# Patient Record
Sex: FEMALE | Race: OTHER | NOT HISPANIC OR LATINO
[De-identification: names, ages, dates, MRNs, and addresses within clinical notes are randomized per-mention and may not be internally consistent; named-entity substitution may affect disease eponyms.]

---

## 2023-05-23 ENCOUNTER — APPOINTMENT (OUTPATIENT)
Dept: OTOLARYNGOLOGY | Facility: CLINIC | Age: 53
End: 2023-05-23
Payer: COMMERCIAL

## 2023-05-23 VITALS
BODY MASS INDEX: 28.72 KG/M2 | SYSTOLIC BLOOD PRESSURE: 128 MMHG | WEIGHT: 183 LBS | HEIGHT: 67 IN | HEART RATE: 72 BPM | TEMPERATURE: 97 F | DIASTOLIC BLOOD PRESSURE: 97 MMHG

## 2023-05-23 PROBLEM — Z00.00 ENCOUNTER FOR PREVENTIVE HEALTH EXAMINATION: Status: ACTIVE | Noted: 2023-05-23

## 2023-05-23 PROCEDURE — 31231 NASAL ENDOSCOPY DX: CPT

## 2023-05-23 PROCEDURE — 99204 OFFICE O/P NEW MOD 45 MIN: CPT | Mod: 25

## 2023-05-23 NOTE — PROCEDURE
[FreeTextEntry3] : Nasal Endoscopy:\par mild sinonasal mucosal edema/erythema\par clear mucus throughout\par white mucoid drainage from right SER\par no mucopus or polyps

## 2023-05-23 NOTE — DATA REVIEWED
[de-identified] : Thyroid US 9/24/2018:\par INDINGS: \par The right thyroid gland measures 4.6 x 1.3 x 1.6 cm. The left thyroid gland measures 4.7 x 1.0 x 1.6 cm. \par \par The isthmus is normal measuring 3 mm. Solid heterogeneous nodule in the right isthmus measures 2.4 x 1.2 x 2.4 cm.\par \par The right thyroid lobe demonstrates normal echogenicity and normal blood flow. There is no discrete right thyroid nodule.\par \par The left thyroid lobe demonstrates normal echogenicity and normal blood flow.  Solid nodule in the left lower pole measures 1.7 x 0.7 x 1.2 cm..\par \par IMPRESSION: Two solid thyroid nodules, the dominant nodule in the right isthmus and left lower pole.

## 2023-05-23 NOTE — PHYSICAL EXAM
[de-identified] : ~2cm midline infrahyoid mass, soft, nontender - isthmus vs TGDC? [de-identified] : cerumen removed from right EAC w curet. TM intact, ME clear [Nasal Endoscopy Performed] : nasal endoscopy was performed, see procedure section for findings [Midline] : trachea located in midline position [Normal] : no rashes

## 2023-05-23 NOTE — HISTORY OF PRESENT ILLNESS
[de-identified] : 52F here for initial evaluation.\par \par For years, but in particular over the past month, she c/o cheek pain/discomfort with nasal congestion/obstruction. There is also a dry cough. Nasal sprays do not help and she was also given zpak which did not help recently.\par There are allergies on prior testing and she underwent shots for ~1yr but felt they did not help and stopped them.\par She has known thyroid nodules with prior benign FNAs in 2018. She feels like sometimes there is something in her neck.\par There is no difficulty eating, breathing, swallowing or talking.\par \par ROS otherwise unremarkable.

## 2023-05-23 NOTE — CONSULT LETTER
[Dear  ___] : Dear  [unfilled], [Courtesy Letter:] : I had the pleasure of seeing your patient, [unfilled], in my office today. [Consult Closing:] : Thank you very much for allowing me to participate in the care of this patient.  If you have any questions, please do not hesitate to contact me. [Sincerely,] : Sincerely, [FreeTextEntry3] : Amrik Solomon MD\par Department of Otolaryngology - Head and Neck Surgery\par [facility]

## 2023-05-23 NOTE — ASSESSMENT
[FreeTextEntry1] : 52F here for initial evaluation. For years, but in particular over the past month, she c/o cheek pain/discomfort with nasal congestion/obstruction. There is also a dry cough. Nasal sprays do not help and she was also given zpak which did not help recently. There are allergies on prior testing and she underwent shots for ~1yr but felt they did not help and stopped them. She has known thyroid nodules with prior benign FNAs in 2018. She feels like sometimes there is something in her neck. There is no difficulty eating, breathing, swallowing or talking.\par On exam, there is a 2cm midline infrahyoid mass, soft and nontender. Cerumen was removed from the right EAC.\par Nasal endoscopy shows mild sinonasal mucosal edema/erythema with clear mucus throughout and white mucoid drainage from right SER.\par -Regarding her nasal sx, there is a rhinitis with turbinate hypertrophy on exam, and likely underlying sinusitis as well. Sx have been present for years, but only recently have gotten much worse; nasal sprays and recent course of abx have not helped -> will send for CT sinus and RTO for review.\par -She also has in infrahyoid neck mass - either isthmus nodule or TGDC -> will send for CT neck.\par -Lastly, there is h/o asthma, but she is coughing and only on singulair and no inhalers, and perhaps asthma can be better controlled -> will send for formal pulmonary eval w Dr. Bethea. \par RTO 4 weeks. Yes

## 2023-06-13 ENCOUNTER — APPOINTMENT (OUTPATIENT)
Dept: PULMONOLOGY | Facility: CLINIC | Age: 53
End: 2023-06-13
Payer: COMMERCIAL

## 2023-06-13 VITALS
HEIGHT: 67 IN | WEIGHT: 183 LBS | SYSTOLIC BLOOD PRESSURE: 114 MMHG | DIASTOLIC BLOOD PRESSURE: 86 MMHG | BODY MASS INDEX: 28.72 KG/M2 | HEART RATE: 64 BPM | OXYGEN SATURATION: 98 % | TEMPERATURE: 97.6 F

## 2023-06-13 DIAGNOSIS — Z82.5 FAMILY HISTORY OF ASTHMA AND OTHER CHRONIC LOWER RESPIRATORY DISEASES: ICD-10-CM

## 2023-06-13 PROCEDURE — 99244 OFF/OP CNSLTJ NEW/EST MOD 40: CPT | Mod: 25

## 2023-06-13 PROCEDURE — 94729 DIFFUSING CAPACITY: CPT

## 2023-06-13 PROCEDURE — 94060 EVALUATION OF WHEEZING: CPT

## 2023-06-13 PROCEDURE — 95012 NITRIC OXIDE EXP GAS DETER: CPT

## 2023-06-13 PROCEDURE — 94727 GAS DIL/WSHOT DETER LNG VOL: CPT

## 2023-06-13 RX ORDER — FLUTICASONE FUROATE AND VILANTEROL TRIFENATATE 100; 25 UG/1; UG/1
100-25 POWDER RESPIRATORY (INHALATION)
Qty: 3 | Refills: 1 | Status: ACTIVE | COMMUNITY
Start: 2023-06-13 | End: 1900-01-01

## 2023-06-13 NOTE — ASSESSMENT
[FreeTextEntry1] : Data reviewed:\par \par PFT 06/13/2023 : normal, no BD response / FENO 46\par \par Impression:\par Asthma\par \par Plan:\par I am surprised that her lung function is normal today but she has a history of lifelong asthma and is wheezing today. Start Breo 100-25 or as covered and return 1-2 mos. If no change on controllers then would consider MCT to confirm dx of asthma, chest CT to exclude alternate diagnoses, but at this time asthma is the likeliest diagnosis.

## 2023-06-13 NOTE — CONSULT LETTER
[Dear  ___] : Dear  [unfilled], [( Thank you for referring [unfilled] for consultation for _____ )] : Thank you for referring [unfilled] for consultation for [unfilled] [Please see my note below.] : Please see my note below. [Consult Closing:] : Thank you very much for allowing me to participate in the care of this patient.  If you have any questions, please do not hesitate to contact me. [Sincerely,] : Sincerely, [FreeTextEntry2] : Amrik Solomon MD\par [address]\[place] [address] [FreeTextEntry3] : Maritza Bethea MD, FCCP\par

## 2023-06-13 NOTE — HISTORY OF PRESENT ILLNESS
[TextBox_4] : 06/13/2023: Asked to evaluate patient by Dr Solomon for evaluation of asthma. Lifelong asthma since growing up in [place] Goes to ED about 4x a year and gets prednisone, at change of season. Admitted 2x for asthma and never ICU/intubated. Takes montelukast, albuterol prn, about 1x a day, sometimes 2. Currently not having nocturnal awakenings. Never smoker, works at [employer]. + environmental allergies. Has had IT in the past with no benefit. Has a dog at home in [place].\par

## 2023-06-13 NOTE — PHYSICAL EXAM
[No Acute Distress] : no acute distress [Normal Rate/Rhythm] : normal rate/rhythm [Normal S1, S2] : normal s1, s2 [No Murmurs] : no murmurs [No Clubbing] : no clubbing [No Edema] : no edema [Normal Affect] : normal affect [TextBox_68] : wheezing

## 2023-06-20 ENCOUNTER — APPOINTMENT (OUTPATIENT)
Dept: OTOLARYNGOLOGY | Facility: CLINIC | Age: 53
End: 2023-06-20
Payer: COMMERCIAL

## 2023-06-20 VITALS
HEART RATE: 71 BPM | WEIGHT: 186 LBS | BODY MASS INDEX: 29.19 KG/M2 | SYSTOLIC BLOOD PRESSURE: 138 MMHG | HEIGHT: 67 IN | TEMPERATURE: 98.2 F | DIASTOLIC BLOOD PRESSURE: 94 MMHG

## 2023-06-20 DIAGNOSIS — J32.9 CHRONIC SINUSITIS, UNSPECIFIED: ICD-10-CM

## 2023-06-20 PROCEDURE — 99213 OFFICE O/P EST LOW 20 MIN: CPT

## 2023-06-20 NOTE — HISTORY OF PRESENT ILLNESS
[de-identified] : 52F here in followup.\par \par For years, but in particular over the past month, she c/o cheek pain/discomfort with nasal congestion/obstruction. There is also a dry cough. Nasal sprays do not help and she was also given zpak which did not help recently.\par There are allergies on prior testing and she underwent shots for ~1yr but felt they did not help and stopped them.\par She has known thyroid nodules with prior benign FNAs in 2018. She feels like sometimes there is something in her neck.\par There is no difficulty eating, breathing, swallowing or talking.\par \par CT Neck 6/7/23 (I reviewed images):\par -3cm heterogeneously dense enhancing mass in the midline and just to the left of the midline adjacent to but appearing extrinsic from the isthmus of the thyroid gland. \par \par CT Sinus 6/7/23 (I reviewed images):\par -inferior turbinate hypertrophy\par -mild septal deviation\par -paranasal sinuses clear\par \par ROS otherwise unremarkable.

## 2023-06-20 NOTE — PHYSICAL EXAM
[de-identified] : ~2cm midline infrahyoid mass, soft, nontender - isthmus vs TGDC? [de-identified] : cerumen removed from right EAC w curet. TM intact, ME clear [Nasal Endoscopy Performed] : nasal endoscopy was performed, see procedure section for findings [Midline] : trachea located in midline position [Normal] : no rashes

## 2023-06-20 NOTE — ASSESSMENT
[FreeTextEntry1] : 52F here in followup. For years, but in particular over the past month, she c/o cheek pain/discomfort with nasal congestion/obstruction. There is also a dry cough. Nasal sprays do not help and she was also given zpak which did not help recently. There are allergies on prior testing and she underwent shots for ~1yr but felt they did not help and stopped them. She has known thyroid nodules with prior benign FNAs in 2018. She feels like sometimes there is something in her neck. There is no difficulty eating, breathing, swallowing or talking. CT neck shows a 3cm heterogeneously dense enhancing mass in the midline and just to the left of the midline adjacent to but appearing extrinsic from the isthmus of the thyroid gland c/w goitrous pyramidal lobe. CT sinus shows inferior turbinate hypertrophy and mild septal deviation. On exam, there is a 3cm midline infrahyoid mass, soft and nontender. Nasal endoscopy shows mild sinonasal mucosal edema/erythema with clear mucus throughout and white mucoid drainage from right SER.\par -Regarding her nasal sx, there is a rhinitis with turbinate hypertrophy on exam and imaging. There is no radiographic e/o sinusitis, as above. For now, continue nasal sprays. If not effective, would recommend septoplasty/turbinate reduction.\par -She also has in infrahyoid neck mass - this is c/w very large pyramidal lobe vs isthmus nodule, as seen on imaging. Will send for formal thyroid sonogram and RTO for review. Given the CT, would likely recommend isthmusectomy.\par

## 2023-06-20 NOTE — DATA REVIEWED
[de-identified] : Thyroid US 9/24/2018:\par INDINGS: \par The right thyroid gland measures 4.6 x 1.3 x 1.6 cm. The left thyroid gland measures 4.7 x 1.0 x 1.6 cm. \par \par The isthmus is normal measuring 3 mm. Solid heterogeneous nodule in the right isthmus measures 2.4 x 1.2 x 2.4 cm.\par \par The right thyroid lobe demonstrates normal echogenicity and normal blood flow. There is no discrete right thyroid nodule.\par \par The left thyroid lobe demonstrates normal echogenicity and normal blood flow.  Solid nodule in the left lower pole measures 1.7 x 0.7 x 1.2 cm..\par \par IMPRESSION: Two solid thyroid nodules, the dominant nodule in the right isthmus and left lower pole. [de-identified] : CT Neck 6/7/23:\par FINDINGS:  Cursory evaluation of the intracranial compartment reveals nothing abnormal. There is no evidence of pathologic contrast enhancement. The fourth ventricle is midline. The cerebellopontine angle cisterns are clear. The cranial base intact. The nasopharynx and parapharyngeal spaces are clear. The major salivary glands are unremarkable. There is no evidence of upper aerodigestive tract mass. The supraclavicular region and upper mediastinum are clear\par \par There is a heterogeneous 3 cm mass in the midline anterior neck extending to the left of the midline. Density characteristics suggest a thyroid lesion, perhaps goitrous however this lesion appears separate from the isthmus of the thyroid gland possibly within the thyroglossal duct. The thyroid lobes and isthmus are somewhat heterogeneous but otherwise unremarkable\par \par The enhancement character of this lesion cannot be determined as no precontrast images are made available for review\par \par IMPRESSION:  3 cm heterogeneously dense possibly enhancing mass in the midline and just to the left of the midline adjacent to but appearing extrinsic from the isthmus of the thyroid gland. Please see above comments\par \par CT Sinus 6/7/23:\par FINDINGS: Cursory evaluation of the intracranial compartment reveals nothing abnormal. The fourth ventricle is midline. The cerebellopontine angle cisterns are clear. The cranial base is intact. The nasopharynx and parapharyngeal spaces are clear.\par \par The frontal sinus is well developed and clear\par \par The sphenoid sinus is well developed and clear\par \par There is little if any appreciable ethmoid mucosal thickening. There are small retention cyst along the floor of the right maxillary antrum and lateral wall of left maxillary antrum.\par \par There is thickening and irregular reverse S-shaped deviation of nasal septum with septal spur formation at the apex of both curves especially on the right. Inferior nasal turbinate is thicker than the left. Middle nasal turbinates are moderately prominent. Mucosal thickening surrounds both uncinate processes and there are multiple bilateral Angelica cells larger on the left side. Mucosal thickening surrounds both uncinate processes. The floor of the anterior cranial fossa is intact\par \par IMPRESSION:  Thickening and reverse S shaped deviation of nasal septum with prominent nasal turbinates and large bilateral Angelica cells especially on the left. Mucosal thickening/retention cyst formation as indicated\par

## 2023-06-20 NOTE — CONSULT LETTER
[Dear  ___] : Dear  [unfilled], [Courtesy Letter:] : I had the pleasure of seeing your patient, [unfilled], in my office today. [Consult Closing:] : Thank you very much for allowing me to participate in the care of this patient.  If you have any questions, please do not hesitate to contact me. [Sincerely,] : Sincerely, [FreeTextEntry3] : Amrik Solomon MD\par Department of Otolaryngology - Head and Neck Surgery\par [facility]

## 2023-06-29 ENCOUNTER — RESULT REVIEW (OUTPATIENT)
Age: 53
End: 2023-06-29

## 2023-06-29 ENCOUNTER — APPOINTMENT (OUTPATIENT)
Dept: ULTRASOUND IMAGING | Facility: HOSPITAL | Age: 53
End: 2023-06-29

## 2023-06-29 ENCOUNTER — OUTPATIENT (OUTPATIENT)
Dept: OUTPATIENT SERVICES | Facility: HOSPITAL | Age: 53
LOS: 1 days | End: 2023-06-29
Payer: COMMERCIAL

## 2023-06-29 PROCEDURE — 76536 US EXAM OF HEAD AND NECK: CPT | Mod: 26

## 2023-06-29 PROCEDURE — 76536 US EXAM OF HEAD AND NECK: CPT

## 2023-07-18 ENCOUNTER — APPOINTMENT (OUTPATIENT)
Dept: OTOLARYNGOLOGY | Facility: CLINIC | Age: 53
End: 2023-07-18
Payer: COMMERCIAL

## 2023-07-18 VITALS
DIASTOLIC BLOOD PRESSURE: 88 MMHG | TEMPERATURE: 96 F | SYSTOLIC BLOOD PRESSURE: 128 MMHG | HEIGHT: 67 IN | BODY MASS INDEX: 29.19 KG/M2 | WEIGHT: 186 LBS | HEART RATE: 73 BPM

## 2023-07-18 DIAGNOSIS — R22.1 LOCALIZED SWELLING, MASS AND LUMP, NECK: ICD-10-CM

## 2023-07-18 PROCEDURE — 99213 OFFICE O/P EST LOW 20 MIN: CPT

## 2023-07-18 RX ORDER — FLUTICASONE PROPIONATE 50 UG/1
50 SPRAY, METERED NASAL DAILY
Qty: 1 | Refills: 3 | Status: ACTIVE | COMMUNITY
Start: 2023-07-18 | End: 1900-01-01

## 2023-07-18 RX ORDER — AZELASTINE HYDROCHLORIDE 137 UG/1
137 SPRAY, METERED NASAL DAILY
Qty: 1 | Refills: 2 | Status: ACTIVE | COMMUNITY
Start: 2023-07-18 | End: 1900-01-01

## 2023-07-18 NOTE — DATA REVIEWED
[de-identified] : Thyroid US 6/29/23:\par Findings:\par \par RIGHT LOBE:\par Dimensions: 4.1 x 1.9 x 1.2 cm\par Echotexture: Homogenous.\par Vascularity: Normal.\par \par Nodule 1:\par Location: Upper pole\par Dimensions: 1.6 x 0.6 x 1.2 cm\par Composition: Mixed cystic and solid (1 point)\par Echogenicity (for solid component): Isoechoic\par Shape: Wider than tall.\par Margin: Ill-defined.\par Echogenic Foci: None.\par \par TI-RADS Score:  2\par \par \par LEFT LOBE:\par Dimensions: 3.8 x 1.2 x 1.7 cm\par Echotexture: Homogenous.\par Vascularity: Normal.\par \par Nodule 1:\par Location: Mid lobe\par Dimensions: 0.5 x 0.4 x 0.7 cm\par Composition: Mixed cystic and solid (1 point)\par Echogenicity (for solid component): Isoechoic.\par Shape: Wider than tall.\par Margin: Smooth.\par Echogenic Foci: None.\par \par TI-RADS Score: 2\par \par \par Nodule 2:\par Location: Lower pole\par Dimensions: 1.3 x 0.9 x 1.4 cm\par Composition: Mixed cystic and solid (1 point)\par Echogenicity (for solid component): Hypoechoic.\par Shape: Wider than tall.\par Margin: Smooth.\par Echogenic Foci: None.\par \par TI-RADS Score: 3\par \par \par ISTHMUS:\par Dimensions: 0.5 cm AP\par \par Nodule 1:\par Location: Midline\par Dimensions: 3.5 x 1.5 x 3.1 cm\par Composition: Almost completely solid (2 points)\par Echogenicity (for solid component): Isoechoic.\par Shape: Wider than tall.\par Margin: Smooth.\par Echogenic Foci: None.\par \par TI-RADS Score: 3\par \par \par Cervical Lymph Node Evaluation: No abnormal lymph nodes are identified in the neck.\par \par Parathyroid Examination: Parathyroid glands not visualized.\par \par \par IMPRESSION:\par Multinodular thyroid gland. Dominant 3.5 cm nodule in isthmus meets criteria for biopsy.\par \par \par \par Thyroid US 9/24/2018:\par INDINGS: \par The right thyroid gland measures 4.6 x 1.3 x 1.6 cm. The left thyroid gland measures 4.7 x 1.0 x 1.6 cm. \par \par The isthmus is normal measuring 3 mm. Solid heterogeneous nodule in the right isthmus measures 2.4 x 1.2 x 2.4 cm.\par \par The right thyroid lobe demonstrates normal echogenicity and normal blood flow. There is no discrete right thyroid nodule.\par \par The left thyroid lobe demonstrates normal echogenicity and normal blood flow.  Solid nodule in the left lower pole measures 1.7 x 0.7 x 1.2 cm..\par \par IMPRESSION: Two solid thyroid nodules, the dominant nodule in the right isthmus and left lower pole. [de-identified] : CT Neck 6/7/23:\par FINDINGS:  Cursory evaluation of the intracranial compartment reveals nothing abnormal. There is no evidence of pathologic contrast enhancement. The fourth ventricle is midline. The cerebellopontine angle cisterns are clear. The cranial base intact. The nasopharynx and parapharyngeal spaces are clear. The major salivary glands are unremarkable. There is no evidence of upper aerodigestive tract mass. The supraclavicular region and upper mediastinum are clear\par \par There is a heterogeneous 3 cm mass in the midline anterior neck extending to the left of the midline. Density characteristics suggest a thyroid lesion, perhaps goitrous however this lesion appears separate from the isthmus of the thyroid gland possibly within the thyroglossal duct. The thyroid lobes and isthmus are somewhat heterogeneous but otherwise unremarkable\par \par The enhancement character of this lesion cannot be determined as no precontrast images are made available for review\par \par IMPRESSION:  3 cm heterogeneously dense possibly enhancing mass in the midline and just to the left of the midline adjacent to but appearing extrinsic from the isthmus of the thyroid gland. Please see above comments\par \par CT Sinus 6/7/23:\par FINDINGS: Cursory evaluation of the intracranial compartment reveals nothing abnormal. The fourth ventricle is midline. The cerebellopontine angle cisterns are clear. The cranial base is intact. The nasopharynx and parapharyngeal spaces are clear.\par \par The frontal sinus is well developed and clear\par \par The sphenoid sinus is well developed and clear\par \par There is little if any appreciable ethmoid mucosal thickening. There are small retention cyst along the floor of the right maxillary antrum and lateral wall of left maxillary antrum.\par \par There is thickening and irregular reverse S-shaped deviation of nasal septum with septal spur formation at the apex of both curves especially on the right. Inferior nasal turbinate is thicker than the left. Middle nasal turbinates are moderately prominent. Mucosal thickening surrounds both uncinate processes and there are multiple bilateral Angelica cells larger on the left side. Mucosal thickening surrounds both uncinate processes. The floor of the anterior cranial fossa is intact\par \par IMPRESSION:  Thickening and reverse S shaped deviation of nasal septum with prominent nasal turbinates and large bilateral Angelica cells especially on the left. Mucosal thickening/retention cyst formation as indicated\par

## 2023-07-18 NOTE — CONSULT LETTER
[Dear  ___] : Dear  [unfilled], [Courtesy Letter:] : I had the pleasure of seeing your patient, [unfilled], in my office today. [Consult Closing:] : Thank you very much for allowing me to participate in the care of this patient.  If you have any questions, please do not hesitate to contact me. [Sincerely,] : Sincerely, [FreeTextEntry3] : Amrik Solomon MD\par Department of Otolaryngology - Head and Neck Surgery\par [facility]

## 2023-07-18 NOTE — ASSESSMENT
[FreeTextEntry1] : 52F here in followup. Since last seen her cheek pain/discomfort with nasal congestion/obstruction are much improved as is her coughing. She has known thyroid nodules with prior benign FNAs in 2018. She feels like sometimes there is something in her neck. There is no difficulty eating, breathing, swallowing or talking. CT neck shows a 3cm heterogeneously dense enhancing mass in the midline and just to the left of the midline adjacent to but appearing extrinsic from the isthmus of the thyroid gland c/w goitrous pyramidal lobe. CT sinus shows inferior turbinate hypertrophy and mild septal deviation. Thyroid sonogram demonstrates a multinodular thyroid gland with a dominant 3.5cm isthmus nodule which meets criteria for biopsy. On exam, there is a 3cm midline infrahyoid mass, soft and nontender. \par -Regarding her nasal sx, there is a rhinitis with turbinate hypertrophy on exam and imaging. There is no radiographic e/o sinusitis, as above. For now, continue nasal sprays. If not effective, would recommend septoplasty/turbinate reduction.\par -She also has in infrahyoid neck mass - this is c/w very large isthmus nodule, as seen on imaging. Will send for US-guided FNA and RTO for review.

## 2023-07-18 NOTE — PHYSICAL EXAM
[de-identified] : ~2cm midline infrahyoid mass, soft, nontender - isthmus vs TGDC? [de-identified] : cerumen removed from right EAC w curet. TM intact, ME clear [Nasal Endoscopy Performed] : nasal endoscopy was performed, see procedure section for findings [Midline] : trachea located in midline position [Normal] : no rashes

## 2023-07-18 NOTE — HISTORY OF PRESENT ILLNESS
[de-identified] : 52F here in followup.\par \par Overall she is feeling better and there is much improved cheek pain/discomfort with nasal congestion/obstruction. \par She has known thyroid nodules with prior benign FNAs in 2018. She feels like sometimes there is something in her neck.\par There is no difficulty eating, breathing, swallowing or talking.\par \par Thyroid Sonogram 6/29/23:\par -Multinodular thyroid gland. Dominant 3.5 cm nodule in isthmus meets criteria for biopsy.\par \par CT Neck 6/7/23 (I reviewed images):\par -3cm heterogeneously dense enhancing mass in the midline and just to the left of the midline adjacent to but appearing extrinsic from the isthmus of the thyroid gland. \par \par CT Sinus 6/7/23 (I reviewed images):\par -inferior turbinate hypertrophy\par -mild septal deviation\par -paranasal sinuses clear\par \par ROS otherwise unremarkable.

## 2023-08-15 ENCOUNTER — APPOINTMENT (OUTPATIENT)
Dept: PULMONOLOGY | Facility: CLINIC | Age: 53
End: 2023-08-15
Payer: COMMERCIAL

## 2023-08-15 VITALS
BODY MASS INDEX: 29.19 KG/M2 | HEART RATE: 77 BPM | SYSTOLIC BLOOD PRESSURE: 110 MMHG | OXYGEN SATURATION: 98 % | DIASTOLIC BLOOD PRESSURE: 78 MMHG | WEIGHT: 186 LBS | HEIGHT: 67 IN | TEMPERATURE: 97.7 F

## 2023-08-15 DIAGNOSIS — J45.909 UNSPECIFIED ASTHMA, UNCOMPLICATED: ICD-10-CM

## 2023-08-15 PROCEDURE — 99213 OFFICE O/P EST LOW 20 MIN: CPT

## 2023-08-15 RX ORDER — CETIRIZINE HYDROCHLORIDE 10 MG/1
10 TABLET, FILM COATED ORAL DAILY
Qty: 30 | Refills: 11 | Status: ACTIVE | COMMUNITY
Start: 2023-08-15 | End: 1900-01-01

## 2023-08-15 RX ORDER — FLUTICASONE PROPIONATE AND SALMETEROL 250; 50 UG/1; UG/1
250-50 POWDER RESPIRATORY (INHALATION)
Qty: 3 | Refills: 3 | Status: ACTIVE | COMMUNITY
Start: 2023-08-15 | End: 1900-01-01

## 2023-08-15 RX ORDER — ALBUTEROL SULFATE 90 UG/1
108 (90 BASE) INHALANT RESPIRATORY (INHALATION)
Qty: 1 | Refills: 5 | Status: ACTIVE | COMMUNITY
Start: 2023-08-15 | End: 1900-01-01

## 2023-08-15 RX ORDER — MONTELUKAST 10 MG/1
10 TABLET, FILM COATED ORAL
Qty: 90 | Refills: 3 | Status: ACTIVE | COMMUNITY
Start: 2023-08-15 | End: 1900-01-01

## 2023-08-15 NOTE — ASSESSMENT
[FreeTextEntry1] : Data reviewed:  PFT 06/13/2023 : normal, no BD response / FENO 46  Impression: Asthma  Plan: Breo was not covered. Try Wixela, and if not covered pls call insurance and see what is covered (given list). Can resume montelukast and albuterol, both refilled. Add antihistamine. If no change on controllers then would consider MCT to confirm dx of asthma, chest CT to exclude alternate diagnoses given normal PFT in June.

## 2023-08-15 NOTE — HISTORY OF PRESENT ILLNESS
[TextBox_4] : 06/13/2023: Asked to evaluate patient by Dr Solomon for evaluation of asthma. Lifelong asthma since growing up in [place] Goes to ED about 4x a year and gets prednisone, at change of season. Admitted 2x for asthma and never ICU/intubated. Takes montelukast, albuterol prn, about 1x a day, sometimes 2. Currently not having nocturnal awakenings. Never smoker, works at [employer]. + environmental allergies. Has had IT in the past with no benefit. Has a dog at home in [place].   8/15/2023: The Breo was not covered and no one notified us and she is out of albuterol and montelukast. So she remains symptomatic using albuterol daily and also having uncontrolled allergy symptoms.

## 2023-10-10 ENCOUNTER — APPOINTMENT (OUTPATIENT)
Dept: PULMONOLOGY | Facility: CLINIC | Age: 53
End: 2023-10-10

## 2024-01-02 ENCOUNTER — EMERGENCY (EMERGENCY)
Facility: HOSPITAL | Age: 54
LOS: 1 days | Discharge: HOME | End: 2024-01-02
Admitting: EMERGENCY MEDICINE
Payer: COMMERCIAL

## 2024-01-02 VITALS
SYSTOLIC BLOOD PRESSURE: 135 MMHG | HEART RATE: 88 BPM | WEIGHT: 184.97 LBS | DIASTOLIC BLOOD PRESSURE: 78 MMHG | OXYGEN SATURATION: 97 % | TEMPERATURE: 98 F | HEIGHT: 66 IN | RESPIRATION RATE: 17 BRPM
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
## 2024-01-02 DIAGNOSIS — M54.50 LOW BACK PAIN, UNSPECIFIED: ICD-10-CM

## 2024-01-02 DIAGNOSIS — U07.1 COVID-19: ICD-10-CM
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
## 2024-01-02 DIAGNOSIS — J45.909 UNSPECIFIED ASTHMA, UNCOMPLICATED: ICD-10-CM

## 2024-01-02 DIAGNOSIS — S20.212A CONTUSION OF LEFT FRONT WALL OF THORAX, INITIAL ENCOUNTER: ICD-10-CM

## 2024-01-02 LAB
FLUAV AG NPH QL: SIGNIFICANT CHANGE UP
FLUAV AG NPH QL: SIGNIFICANT CHANGE UP
FLUBV AG NPH QL: SIGNIFICANT CHANGE UP
FLUBV AG NPH QL: SIGNIFICANT CHANGE UP
RSV RNA NPH QL NAA+NON-PROBE: SIGNIFICANT CHANGE UP
RSV RNA NPH QL NAA+NON-PROBE: SIGNIFICANT CHANGE UP
SARS-COV-2 RNA SPEC QL NAA+PROBE: DETECTED
SARS-COV-2 RNA SPEC QL NAA+PROBE: DETECTED

## 2024-01-02 PROCEDURE — 71101 X-RAY EXAM UNILAT RIBS/CHEST: CPT

## 2024-01-02 PROCEDURE — 99284 EMERGENCY DEPT VISIT MOD MDM: CPT

## 2024-01-02 PROCEDURE — 99283 EMERGENCY DEPT VISIT LOW MDM: CPT | Mod: 25

## 2024-01-02 PROCEDURE — 96372 THER/PROPH/DIAG INJ SC/IM: CPT

## 2024-01-02 PROCEDURE — 87637 SARSCOV2&INF A&B&RSV AMP PRB: CPT

## 2024-01-02 PROCEDURE — 71101 X-RAY EXAM UNILAT RIBS/CHEST: CPT | Mod: 26,RT

## 2024-01-02 RX ORDER — KETOROLAC TROMETHAMINE 30 MG/ML
30 SYRINGE (ML) INJECTION ONCE
Refills: 0 | Status: DISCONTINUED | OUTPATIENT
Start: 2024-01-02 | End: 2024-01-02

## 2024-01-02 RX ORDER — OXYCODONE AND ACETAMINOPHEN 5; 325 MG/1; MG/1
1 TABLET ORAL ONCE
Refills: 0 | Status: DISCONTINUED | OUTPATIENT
Start: 2024-01-02 | End: 2024-01-02

## 2024-01-02 RX ORDER — LIDOCAINE 4 G/100G
1 CREAM TOPICAL ONCE
Refills: 0 | Status: COMPLETED | OUTPATIENT
Start: 2024-01-02 | End: 2024-01-02

## 2024-01-02 RX ORDER — OXYCODONE AND ACETAMINOPHEN 5; 325 MG/1; MG/1
1 TABLET ORAL
Qty: 8 | Refills: 0
Start: 2024-01-02 | End: 2024-01-03

## 2024-01-02 RX ADMIN — Medication 30 MILLIGRAM(S): at 09:54

## 2024-01-02 RX ADMIN — LIDOCAINE 1 PATCH: 4 CREAM TOPICAL at 09:54

## 2024-01-02 RX ADMIN — OXYCODONE AND ACETAMINOPHEN 1 TABLET(S): 5; 325 TABLET ORAL at 09:55

## 2024-01-02 NOTE — ED PROVIDER NOTE - CLINICAL SUMMARY MEDICAL DECISION MAKING FREE TEXT BOX
52 yo f with pmh of asthma c/o R lower rib/back pain x 4 days. Pt was sitting on a chair that broke, she fell backward and hit her back on the edge of her bed. No headstrike. pt seen at [facility] that day and was given an injection. Pain worse with movements and deep breaths. Denies sob, numbness, tingling. Pt also requesting covid test, reports congestion, rhinorrhea, mild cough x 3 days. +ttp to R lower ribs, no bruising, no deformity or step off 54 yo f with pmh of asthma c/o R lower rib/back pain x 4 days. Pt was sitting on a chair that broke, she fell backward and hit her back on the edge of her bed. No headstrike. pt seen at [facility] that day and was given an injection. Pain worse with movements and deep breaths. Denies sob, numbness, tingling. Pt also requesting covid test, reports congestion, rhinorrhea, mild cough x 3 days. +ttp to R lower ribs, no bruising, no deformity or step off 52 yo f with pmh of asthma c/o R lower rib/back pain x 4 days. Pt was sitting on a chair that broke, she fell backward and hit her back on the edge of her bed. No headstrike. pt seen at [facility] that day and was given an injection. Pain worse with movements and deep breaths. Denies sob, numbness, tingling. Pt also requesting covid test, reports congestion, rhinorrhea, mild cough x 3 days. +ttp to R lower ribs, no bruising, no deformity or step off. Xr neg for fx 54 yo f with pmh of asthma c/o R lower rib/back pain x 4 days. Pt was sitting on a chair that broke, she fell backward and hit her back on the edge of her bed. No headstrike. pt seen at [facility] that day and was given an injection. Pain worse with movements and deep breaths. Denies sob, numbness, tingling. Pt also requesting covid test, reports congestion, rhinorrhea, mild cough x 3 days. +ttp to R lower ribs, no bruising, no deformity or step off. Xr neg for fx

## 2024-01-02 NOTE — ED PROVIDER NOTE - PHYSICAL EXAMINATION
CONSTITUTIONAL: Well-appearing; well-nourished; in no apparent distress.   HEAD: Normocephalic; atraumatic.   NECK: Supple; non-tender;   CARDIOVASCULAR: Normal S1, S2; no murmurs, rubs, or gallops. Regular rate and rhythm.   RESPIRATORY: Breathing easily; breath sounds clear and equal bilaterally; no wheezes, rhonchi, or rales.  MSK: FROM at all extremities,   Back: +ttp to R lower ribs, no bruising, no deformity or step off   Neuro: CN 2-12 intact, normal finger to nose, normal gait, strength normal

## 2024-01-02 NOTE — ED PROVIDER NOTE - OBJECTIVE STATEMENT
52 yo f with pmh of asthma c/o R lower rib/back pain x 4 days. Pt was sitting on a chair that broke, she fell backward and hit her back on the edge of her bed. No headstrike. pt seen at [facility] that day and was given an injection. Pt has been taking ibuprofen with minimal relief. Pain worse with movements and deep breaths. Denies sob, numbness, tingling. Pt also requesting covid test, reports congestion, rhinorrhea, mild cough x 3 days. 54 yo f with pmh of asthma c/o R lower rib/back pain x 4 days. Pt was sitting on a chair that broke, she fell backward and hit her back on the edge of her bed. No headstrike. pt seen at [facility] that day and was given an injection. Pt has been taking ibuprofen with minimal relief. Pain worse with movements and deep breaths. Denies sob, numbness, tingling. Pt also requesting covid test, reports congestion, rhinorrhea, mild cough x 3 days.

## 2024-01-02 NOTE — ED ADULT TRIAGE NOTE - CHIEF COMPLAINT QUOTE
c/o back pain s/p fall friday. Denies hitting head/LOC/blood thinner use. Also notes recent congestion.

## 2024-01-02 NOTE — ED ADULT NURSE NOTE - NSFALLUNIVINTERV_ED_ALL_ED
Bed/Stretcher in lowest position, wheels locked, appropriate side rails in place/Call bell, personal items and telephone in reach/Instruct patient to call for assistance before getting out of bed/chair/stretcher/Non-slip footwear applied when patient is off stretcher/[place] to call system/Physically safe environment - no spills, clutter or unnecessary equipment/Purposeful proactive rounding/Room/bathroom lighting operational, light cord in reach Bed/Stretcher in lowest position, wheels locked, appropriate side rails in place/Call bell, personal items and telephone in reach/Instruct patient to call for assistance before getting out of bed/chair/stretcher/Non-slip footwear applied when patient is off stretcher/[place] to call system/Physically safe environment - no spills, clutter or unnecessary equipment/Purposeful proactive rounding/Room/bathroom lighting operational, light cord in reach

## 2024-01-02 NOTE — ED PROVIDER NOTE - PATIENT PORTAL LINK FT
You can access the FollowMyHealth Patient Portal offered by [facility] by registering at the following website: [email or web address]. By joining [facility]’s FollowMyHealth portal, you will also be able to view your health information using other applications (apps) compatible with our system. You can access the FollowMyHealth Patient Portal offered by [facility] by registering at the following website: [email or web address]. By joining [employer]’s FollowMyHealth portal, you will also be able to view your health information using other applications (apps) compatible with our system.

## 2024-01-02 NOTE — ED PROVIDER NOTE - NSFOLLOWUPINSTRUCTIONS_ED_ALL_ED_FT
Your XR did not show any broken ribs, you likely just bruised it. Take ibuprofen and tylenol alternating as  needed for pain, for severe pain you can take a percocet.       Contusion    A contusion is a deep bruise. Contusions are the result of a blunt injury to tissues and muscle fibers under the skin. The skin overlying the contusion may turn blue, purple, or yellow. Symptoms also include pain and swelling in the injured area.    SEEK IMMEDIATE MEDICAL CARE IF YOU HAVE ANY OF THE FOLLOWING SYMPTOMS: severe pain, numbness, tingling, pain, weakness, or skin color/temperature change in any part of your body distal to the injury.

## 2024-01-02 NOTE — ED ADULT NURSE NOTE - OBJECTIVE STATEMENT
alert and orientedx4, presenting to the ed for fall on sunday, states she has lingering right flank pain worse with certain movements, pt denies hematuria or urinary symptoms, denies nausea/fever/chills. Patient ambulatory with steady gait, no skin irritation to site from fall. Patient states she has been taking motrin without improvement in pain.